# Patient Record
Sex: MALE | Race: BLACK OR AFRICAN AMERICAN | NOT HISPANIC OR LATINO | Employment: OTHER | URBAN - METROPOLITAN AREA
[De-identification: names, ages, dates, MRNs, and addresses within clinical notes are randomized per-mention and may not be internally consistent; named-entity substitution may affect disease eponyms.]

---

## 2021-02-23 ENCOUNTER — HISTORICAL (OUTPATIENT)
Dept: LAB | Facility: HOSPITAL | Age: 61
End: 2021-02-23

## 2021-02-23 LAB
APPEARANCE, UA: CLEAR
BACTERIA SPEC CULT: NORMAL
BILIRUB UR QL STRIP: NEGATIVE
COLOR UR: YELLOW
GLUCOSE (UA): NEGATIVE
HGB UR QL STRIP: NEGATIVE
KETONES UR QL STRIP: NEGATIVE
LEUKOCYTE ESTERASE UR QL STRIP: NEGATIVE
NITRITE UR QL STRIP: NEGATIVE
PH UR STRIP: 7 [PH] (ref 4.6–8)
PROT UR QL STRIP: NEGATIVE
RBC #/AREA URNS HPF: NORMAL /[HPF]
SARS-COV-2 AG RESP QL IA.RAPID: NEGATIVE
SP GR UR STRIP: <=1.005 (ref 1–1.03)
SQUAMOUS EPITHELIAL, UA: NORMAL
UROBILINOGEN UR STRIP-ACNC: 0.2
WBC #/AREA URNS HPF: NORMAL /[HPF]

## 2021-02-26 ENCOUNTER — HISTORICAL (OUTPATIENT)
Dept: MEDSURG UNIT | Facility: HOSPITAL | Age: 61
End: 2021-02-26

## 2021-03-01 LAB — SARS-COV-2 AG RESP QL IA.RAPID: NOT DETECTED

## 2021-03-02 ENCOUNTER — HISTORICAL (OUTPATIENT)
Dept: ANESTHESIOLOGY | Facility: HOSPITAL | Age: 61
End: 2021-03-02

## 2021-03-04 ENCOUNTER — HISTORICAL (OUTPATIENT)
Dept: ADMINISTRATIVE | Facility: HOSPITAL | Age: 61
End: 2021-03-04

## 2021-10-11 ENCOUNTER — HISTORICAL (OUTPATIENT)
Dept: SURGERY | Facility: HOSPITAL | Age: 61
End: 2021-10-11

## 2021-10-11 LAB — SARS-COV-2 AG RESP QL IA.RAPID: NOT DETECTED

## 2021-10-12 ENCOUNTER — HISTORICAL (OUTPATIENT)
Dept: ANESTHESIOLOGY | Facility: HOSPITAL | Age: 61
End: 2021-10-12

## 2022-03-25 ENCOUNTER — HISTORICAL (OUTPATIENT)
Dept: CARDIOLOGY | Facility: HOSPITAL | Age: 62
End: 2022-03-25

## 2022-04-11 ENCOUNTER — HISTORICAL (OUTPATIENT)
Dept: ADMINISTRATIVE | Facility: HOSPITAL | Age: 62
End: 2022-04-11

## 2022-04-21 ENCOUNTER — HISTORICAL (OUTPATIENT)
Dept: ADMINISTRATIVE | Facility: HOSPITAL | Age: 62
End: 2022-04-21
Payer: MEDICAID

## 2022-04-25 VITALS
WEIGHT: 170 LBS | DIASTOLIC BLOOD PRESSURE: 75 MMHG | HEIGHT: 70 IN | OXYGEN SATURATION: 98 % | BODY MASS INDEX: 24.34 KG/M2 | SYSTOLIC BLOOD PRESSURE: 136 MMHG

## 2022-04-30 NOTE — OP NOTE
Patient:   Ja Baker             MRN: [id number]            FIN: [id number]               Age:   60 years     Sex:  Male     :  1960   Associated Diagnoses:   Encounter for screening colonoscopy   Author:   Bita Martinez MD      Operative Note   Operative Information   Date/ Time:  2021 09:15:00.     Procedures Performed: Procedure Code   Colonoscopy, flexible; diagnostic, including collection of specimen(s) by brushing or washing, when performed (separate procedure) (72742)..     Indications: 60-year-old white male in need of first age-appropriate colonoscopy with no family history colorectal cancer.     Preoperative Diagnosis: Encounter for screening colonoscopy (MFT48-BL Z12.11).     Postoperative Diagnosis: Encounter for screening colonoscopy (HPN32-KR Z12.11).     Surgeon: Bita Martinez MD.     Anesthesia: Intravenous MAC.     Speciman Removed: None.     Description of Procedure/Findings/    Complications: Patient was brought to the endoscopy suite laid in the left lateral decubitus position right side up.  Intravenous anesthesia was provided.  Digital rectal exam performed exhibiting good anal rectal tone and no masses.  An endoscope was then passed through the anus intubating the rectum and with gentle insufflation reaching the cecum.  Upon reaching the cecum pictures were taken the scope was then slowly withdrawn.  Overall the cecum ascending transverse descending and rectosigmoid colon all appear to be grossly normal without mass polyp or ulceration seen.  Scope was retroflexed in the distal rectum revealing grade 1-2 internal hemorrhoids without sign of thrombosis ulceration or bleeding.  Scope was returned to neutral position the colon was evacuated of air.  Patient was relieved of anesthesia stable condition and transfer the postanesthesia care unit..     Esimated blood loss: No blood loss.     Findings: Normal colonoscopy.     Complications: None.     Notes: Recommendation  no repeat colonoscopy in 7 years.

## 2022-04-30 NOTE — OP NOTE
Patient:   Ja Baker             MRN: [id number]            FIN: [id number]               Age:   60 years     Sex:  Male     :  1960   Associated Diagnoses:   Bilateral inguinal hernia, without obstruction or gangrene   Author:   Bita Martinez MD      Operative Note   Operative Information   Date/ Time:  3/2/2021 17:11:00.     Procedures Performed: Procedure Code   Hernia Repair Inguinal Laparoscopic Robot Assist (Bilateral) on 3/2/2021 at 60 Years.  Comments:  3/2/2021 16:06 CST - Rich ROSENTHAL, Celestino Dailey  auto-populated from documented surgical case  Laparoscopy, surgical; repair initial inguinal hernia (27470).  Comments:  3/29/2021 7:42 CDT - Bita Martinez MD  Modifier 50-bilateral procedure.     Indications: 60-year-old white male with bulging of the bilateral inguinal canals most consistent with symptomatic bilateral inguinal hernias elected to undergo robotic assisted laparoscopic bilateral inguinal hernia repair with mesh.     Preoperative Diagnosis: Bilateral inguinal hernia, without obstruction or gangrene (PXR39-BC K40.2).     Postoperative Diagnosis: Bilateral inguinal hernia, without obstruction or gangrene (YRD39-MD K40.2).     Surgeon: Bita Martinez MD.     Anesthesia: General.     Speciman Removed: None.     Description of Procedure/Findings/    Complications:  Patient was brought to the operating theater laid in the supine position general endotracheal intubation anesthesia was performed.  Cabrera catheter was then placed.  The abdomen from bilateral nipples down to bilateral groins were sterilely prepped and draped in normal surgical fashion.  1% lidocaine and epinephrine were used to infiltrate the supraumbilical site and a 15 blade was used to excise incise the skin with dissection down to the fascia.  The abdomen was entered by Veress needle technique without difficulty.  The abdomen was then insufflated 15 mmHg.  Two 8 mm trochars were then placed in bilateral  quadrants adjacent to robotic view port approximately 10 cm from the midline under direct visualization.  The patient was then placed in slight Trendelenburg position and easy identification of the right and left inguinal regions were evaluated.  There was noted to be a direct right inguinal hernia without identified mesh and an direct left inguinal hernia.  The laproscopic robot was then brought into position overlying the right side of the patient.  The robotic arms were then securely fashion to the port sites and two robotic instruments were introduced into the abdomen under direct visualization.  All appropriate internal anatomy was identified and the peritoneum was incised using endoscopic may approximately 8 cm above the superior edge of the identified right  and left indirect inguinal hernia.  The dissection was carried from a level of the ASIS to the median umbilical ligament in bilateral lower quadrants.  A large peritoneal flap was then mobilized inferiorly using blunt and sharp dissection.  The inferior epigastric vessels were exposed and the pubic symphysis was identified medially.  Catarino's ligament was then dissected was junction with the iliac vein.  Dissection was continued inferiorly to the iliopubic track care was taken to avoid injury to the femoral branch of the genitofemoral nerve and the lateral femoral cutaneous nerve.  All the cord structures were then peritonealized after reducing the peritoneal sac from within the inguinal canals bilaterally.  It was then gently dissected from the cord structures and reduced back into the peritoneal cavity.  After completion of the lateral dissection taking care to note all sensory nerves two large pieces of composite mesh were then introduced into the abdomen.  They were then worked into position in the preperitoneal space and unrolled to completely cover the indirect, direct and femoral canal spaces.  This mesh was a self adherent mesh made by covidein  not requiring suturing or staples.  The peritoneal flaps were then closed over the mesh making sure not to have rolled edges and reapproximated using monofilament suture in a running locking fashion.  Sutures were then removed from the abdomen under direct visualization and the ports were removed from the abdomen under direct visualization.  At this point the abdomen was allowed to exsufflated and the instruments were removed.  The patient was returned to normal resting supine position.  The port sites were then closed.    The skin was reapproximated using running 4-0 subcuticular Monocryl.  The patient was relieved anesthesia in a stable condition and transferred to postanesthesia care unit.  All lap instrument counts were correct ×3.  .     Esimated blood loss: loss  5  cc.     Complications: None.

## 2022-04-30 NOTE — OP NOTE
Patient:   Ja Baker             MRN: [id number]            FIN: [id number]               Age:   60 years     Sex:  Male     :  1960   Associated Diagnoses:   Mass of soft tissue; Pilar cyst of scalp   Author:   Bita Martinez MD      Operative Note   Operative Information   Date/ Time:  10/12/2021 14:45:00.     Procedures Performed: Procedure Code   Excision, tumor, soft tissue of face or scalp, subcutaneous; 2 cm or greater (71054)..     Indications: 60-year-old male with symptomatic enlarging soft tissue mass on the apex of the scalp elected to undergo excision for treatment of symptoms.     Preoperative Diagnosis: Mass of soft tissue (CQA45-OW M79.89).     Postoperative Diagnosis: Pilar cyst of scalp (VSY53-PA L72.11).     Surgeon: Bita Martinez MD.     Anesthesia: Intravenous MAC.     Speciman Removed: 4 x 3 cm subcutaneous cyst of the scalp.     Description of Procedure/Findings/    Complications: Patient was brought to the operative theater laid in a supine position intravenous MAC anesthesia was provided.  Preoperative antibiotics administered.  The apex of the scalp was then trimmed of all hair and sterilely prepped and draped in normal surgical fashion using chlorhexidine.  1% lidocaine with epinephrine used filtrated the subcutaneous tissue surrounding this region.  15 blade was then used to incise the skin in a longitudinal manner with further dissection carried out using tenotomy scissors.  Subcutaneous mass appeared to be that of a pilar cyst.  It was circumferentially dissected and removed in total.  The cavity was made hemostatic using Bovie cauterization.  The wound edges were then reapproximated in a multilayered fashion using 3-0 Vicryl of the deep dermal and 4-0 nylon on the skin.  A sterile bandage was then placed upon the wound.  Patient was then relieved of anesthesia stable condition and transferred postanesthesia care unit..     Esimated blood loss: loss  3  cc.      Complications: None.

## 2022-05-12 ENCOUNTER — TELEPHONE (OUTPATIENT)
Dept: CARDIAC SURGERY | Facility: CLINIC | Age: 62
End: 2022-05-12
Payer: MEDICAID

## 2022-05-16 ENCOUNTER — HOSPITAL ENCOUNTER (OUTPATIENT)
Dept: RADIOLOGY | Facility: HOSPITAL | Age: 62
Discharge: HOME | DRG: 253 | End: 2022-05-16
Attending: THORACIC SURGERY (CARDIOTHORACIC VASCULAR SURGERY)
Payer: MEDICAID

## 2022-05-16 ENCOUNTER — ANESTHESIA EVENT (OUTPATIENT)
Dept: SURGERY | Facility: HOSPITAL | Age: 62
DRG: 253 | End: 2022-05-16
Payer: MEDICAID

## 2022-05-16 DIAGNOSIS — I73.9 PERIPHERAL VASCULAR DISEASE, UNSPECIFIED: ICD-10-CM

## 2022-05-16 DIAGNOSIS — Z79.01 LONG TERM (CURRENT) USE OF ANTICOAGULANTS: ICD-10-CM

## 2022-05-16 PROCEDURE — 99223 PR INITIAL HOSPITAL CARE,LEVL III: ICD-10-PCS | Mod: 57,,, | Performed by: PHYSICIAN ASSISTANT

## 2022-05-16 PROCEDURE — 71046 X-RAY EXAM CHEST 2 VIEWS: CPT | Mod: TC

## 2022-05-16 PROCEDURE — 99223 1ST HOSP IP/OBS HIGH 75: CPT | Mod: 57,,, | Performed by: PHYSICIAN ASSISTANT

## 2022-05-16 NOTE — H&P
[facility]  History & Physical  Cardiothoracic Surgery    SUBJECTIVE:     Chief Complaint/Reason for Admission: leg pain    History of Present Illness:  Patient is a 61 y.o. male presents with  Right leg claudication  Has occuled l iliac and r fem stenosis and sfa disease      Family History of Heart Disease: no    No current facility-administered medications on file prior to encounter.     No current outpatient medications on file prior to encounter.        Review of patient's allergies indicates:  Not on File     No past medical history on file.     No past surgical history on file.        Review of Systems:  Review of Systems   All other systems reviewed and are negative.     r leg pain    OBJECTIVE:        Physical Exam:  Physical Exam  Vitals reviewed.   HENT:      Head: Normocephalic.      Right Ear: Tympanic membrane normal.      Left Ear: Tympanic membrane normal.      Nose: Nose normal.      Mouth/Throat:      Mouth: Mucous membranes are moist.   Eyes:      Pupils: Pupils are equal, round, and reactive to light.   Cardiovascular:      Rate and Rhythm: Normal rate and regular rhythm.      Pulses: Normal pulses.   Pulmonary:      Effort: Pulmonary effort is normal.      Breath sounds: Normal breath sounds.   Abdominal:      Palpations: Abdomen is soft.   Musculoskeletal:         General: Normal range of motion.      Cervical back: Normal range of motion.   Skin:     General: Skin is warm.   Neurological:      General: No focal deficit present.      Mental Status: He is alert.   Psychiatric:         Mood and Affect: Mood normal.          Laboratory:  I have reviewed all pertinent lab results within the past 24 hours.    Diagnostic Results:  CT: Reviewed          ASSESSMENT/PLAN:     A: PVD  P: Fem- Fem, r fem endarterectomy, R fem pop

## 2022-05-16 NOTE — ANESTHESIA PREPROCEDURE EVALUATION
05/16/2022  Ja Baker is a 61 y.o., male  Right leg claudication  Has occuled l iliac and r fem stenosis and sfa disease. Here for fem-fem and right fempop bypass.  No reported hx CAD, not able to exercise due to claudication. Quit smoking.  Pre-op Assessment    I have reviewed the Patient Summary Reports.     I have reviewed the Nursing Notes. I have reviewed the NPO Status.   I have reviewed the Medications.     Review of Systems  Anesthesia Hx:  No problems with previous Anesthesia    Cardiovascular:  Peripheral Arterial Disease, Claudication Femoral-Popliteal Occlusive Disease, right        Physical Exam  General: Well nourished, Cooperative, Alert and Oriented    Airway:  Mallampati: II / II  Mouth Opening: Normal  TM Distance: Normal  Tongue: Normal  Neck ROM: Normal ROM    Dental:  Periodontal disease  Many broken/rotten teeth  None loose  Heart:  Rate: Normal  Rhythm: Regular Rhythm        Anesthesia Plan  Type of Anesthesia, risks & benefits discussed:    Anesthesia Type: Gen ETT  Intra-op Monitoring Plan: Standard ASA Monitors and Art Line  Post Op Pain Control Plan: multimodal analgesia and IV/PO Opioids PRN  Airway Plan: Direct, Post-Induction  Informed Consent: Informed consent signed with the Patient and all parties understand the risks and agree with anesthesia plan.  All questions answered. Patient consented to blood products? Yes  ASA Score: 3  Day of Surgery Review of History & Physical: H&P Update referred to the surgeon/provider.    Ready For Surgery From Anesthesia Perspective.     .    Latest Reference Range & Units 05/16/22 09:35   WBC 4.5 - 11.5 x10(3)/mcL 9.2   RBC 4.70 - 6.10 x10(6)/mcL 4.88   Hemoglobin 14.0 - 18.0 gm/dL 15.3   Hematocrit 42.0 - 52.0 % 46.2   MCV 80.0 - 94.0 fL 94.7 (H)   MCH 27.0 - 31.0 pg 31.4 (H)   MCHC 33.0 - 36.0 mg/dL 33.1   RDW 11.5 - 17.0 % 14.1    Platelets 130 - 400 x10(3)/mcL 165   (H): Data is abnormally high  Results for orders placed or performed in visit on 05/16/22   EKG 12-lead    Collection Time: 05/16/22  9:43 AM    Narrative    Test Reason : Z79.01,I73.9,    Vent. Rate : 063 BPM     Atrial Rate : 063 BPM     P-R Int : 124 ms          QRS Dur : 082 ms      QT Int : 402 ms       P-R-T Axes : 061 082 071 degrees     QTc Int : 411 ms    Normal sinus rhythm  Normal ECG  No previous ECGs available    Referred By: WILMAN SANDHU           Confirmed By:

## 2022-05-17 ENCOUNTER — ANESTHESIA (OUTPATIENT)
Dept: SURGERY | Facility: HOSPITAL | Age: 62
DRG: 253 | End: 2022-05-17
Payer: MEDICAID

## 2022-05-17 ENCOUNTER — HOSPITAL ENCOUNTER (INPATIENT)
Facility: HOSPITAL | Age: 62
LOS: 1 days | Discharge: HOME | DRG: 253 | End: 2022-05-18
Attending: THORACIC SURGERY (CARDIOTHORACIC VASCULAR SURGERY) | Admitting: THORACIC SURGERY (CARDIOTHORACIC VASCULAR SURGERY)
Payer: MEDICAID

## 2022-05-17 DIAGNOSIS — I73.9 PVD (PERIPHERAL VASCULAR DISEASE) WITH CLAUDICATION: ICD-10-CM

## 2022-05-17 PROCEDURE — C1768 GRAFT, VASCULAR: HCPCS | Performed by: THORACIC SURGERY (CARDIOTHORACIC VASCULAR SURGERY)

## 2022-05-17 PROCEDURE — 63600175 PHARM REV CODE 636 W HCPCS

## 2022-05-17 PROCEDURE — 37000008 HC ANESTHESIA 1ST 15 MINUTES: Performed by: THORACIC SURGERY (CARDIOTHORACIC VASCULAR SURGERY)

## 2022-05-17 PROCEDURE — 36620 INSERTION CATHETER ARTERY: CPT | Performed by: NURSE ANESTHETIST, CERTIFIED REGISTERED

## 2022-05-17 PROCEDURE — 63600175 PHARM REV CODE 636 W HCPCS: Performed by: ANESTHESIOLOGY

## 2022-05-17 PROCEDURE — 35656 BPG FEMORAL-POPLITEAL: CPT | Mod: 51,RT,, | Performed by: THORACIC SURGERY (CARDIOTHORACIC VASCULAR SURGERY)

## 2022-05-17 PROCEDURE — 35656 PR BYPASS GRAFT OTHR,FEM-POP: ICD-10-PCS | Mod: 51,RT,, | Performed by: THORACIC SURGERY (CARDIOTHORACIC VASCULAR SURGERY)

## 2022-05-17 PROCEDURE — 35656 PR BYPASS GRAFT OTHR,FEM-POP: ICD-10-PCS | Mod: 51,AS,RT, | Performed by: PHYSICIAN ASSISTANT

## 2022-05-17 PROCEDURE — 25000003 PHARM REV CODE 250

## 2022-05-17 PROCEDURE — 25000003 PHARM REV CODE 250: Performed by: PHYSICIAN ASSISTANT

## 2022-05-17 PROCEDURE — 35656 BPG FEMORAL-POPLITEAL: CPT | Mod: 51,AS,RT, | Performed by: PHYSICIAN ASSISTANT

## 2022-05-17 PROCEDURE — 36000708 HC OR TIME LEV III 1ST 15 MIN: Performed by: THORACIC SURGERY (CARDIOTHORACIC VASCULAR SURGERY)

## 2022-05-17 PROCEDURE — 37000009 HC ANESTHESIA EA ADD 15 MINS: Performed by: THORACIC SURGERY (CARDIOTHORACIC VASCULAR SURGERY)

## 2022-05-17 PROCEDURE — 35661 BPG FEMORAL-FEMORAL: CPT | Mod: ,,, | Performed by: THORACIC SURGERY (CARDIOTHORACIC VASCULAR SURGERY)

## 2022-05-17 PROCEDURE — L8670 VASCULAR GRAFT, SYNTHETIC: HCPCS | Performed by: THORACIC SURGERY (CARDIOTHORACIC VASCULAR SURGERY)

## 2022-05-17 PROCEDURE — 11000001 HC ACUTE MED/SURG PRIVATE ROOM

## 2022-05-17 PROCEDURE — 27201423 OPTIME MED/SURG SUP & DEVICES STERILE SUPPLY: Performed by: THORACIC SURGERY (CARDIOTHORACIC VASCULAR SURGERY)

## 2022-05-17 PROCEDURE — 35661 PR BYPASS GRAFT OTHR,FEM-FEM: ICD-10-PCS | Mod: AS,,, | Performed by: PHYSICIAN ASSISTANT

## 2022-05-17 PROCEDURE — 71000033 HC RECOVERY, INTIAL HOUR: Performed by: THORACIC SURGERY (CARDIOTHORACIC VASCULAR SURGERY)

## 2022-05-17 PROCEDURE — 36000709 HC OR TIME LEV III EA ADD 15 MIN: Performed by: THORACIC SURGERY (CARDIOTHORACIC VASCULAR SURGERY)

## 2022-05-17 PROCEDURE — 71000015 HC POSTOP RECOV 1ST HR: Performed by: THORACIC SURGERY (CARDIOTHORACIC VASCULAR SURGERY)

## 2022-05-17 PROCEDURE — 35661 BPG FEMORAL-FEMORAL: CPT | Mod: AS,,, | Performed by: PHYSICIAN ASSISTANT

## 2022-05-17 PROCEDURE — 71000039 HC RECOVERY, EACH ADD'L HOUR: Performed by: THORACIC SURGERY (CARDIOTHORACIC VASCULAR SURGERY)

## 2022-05-17 PROCEDURE — 35661 PR BYPASS GRAFT OTHR,FEM-FEM: ICD-10-PCS | Mod: ,,, | Performed by: THORACIC SURGERY (CARDIOTHORACIC VASCULAR SURGERY)

## 2022-05-17 PROCEDURE — 71000016 HC POSTOP RECOV ADDL HR: Performed by: THORACIC SURGERY (CARDIOTHORACIC VASCULAR SURGERY)

## 2022-05-17 PROCEDURE — C9248 INJ, CLEVIDIPINE BUTYRATE: HCPCS | Mod: JG

## 2022-05-17 DEVICE — GRAFT 6MM X 5CM PROPATEN: Type: IMPLANTABLE DEVICE | Site: FEMUR | Status: FUNCTIONAL

## 2022-05-17 DEVICE — GRAFT VAS PROPATEN 6X80 TWR: Type: IMPLANTABLE DEVICE | Site: FEMUR | Status: FUNCTIONAL

## 2022-05-17 RX ORDER — PROPOFOL 10 MG/ML
VIAL (ML) INTRAVENOUS
Status: DISCONTINUED | OUTPATIENT
Start: 2022-05-17 | End: 2022-05-17

## 2022-05-17 RX ORDER — ONDANSETRON 2 MG/ML
4 INJECTION INTRAMUSCULAR; INTRAVENOUS ONCE
Status: DISCONTINUED | OUTPATIENT
Start: 2022-05-17 | End: 2022-05-18 | Stop reason: HOSPADM

## 2022-05-17 RX ORDER — MEPERIDINE HYDROCHLORIDE 25 MG/ML
12.5 INJECTION INTRAMUSCULAR; INTRAVENOUS; SUBCUTANEOUS ONCE
Status: ACTIVE | OUTPATIENT
Start: 2022-05-17 | End: 2022-05-18

## 2022-05-17 RX ORDER — EPHEDRINE SULFATE 50 MG/ML
INJECTION, SOLUTION INTRAVENOUS
Status: DISCONTINUED | OUTPATIENT
Start: 2022-05-17 | End: 2022-05-17

## 2022-05-17 RX ORDER — DULOXETIN HYDROCHLORIDE 30 MG/1
30 CAPSULE, DELAYED RELEASE ORAL DAILY
COMMUNITY
Start: 2022-05-10

## 2022-05-17 RX ORDER — PROTAMINE SULFATE 10 MG/ML
INJECTION, SOLUTION INTRAVENOUS
Status: DISCONTINUED | OUTPATIENT
Start: 2022-05-17 | End: 2022-05-17

## 2022-05-17 RX ORDER — ATORVASTATIN CALCIUM 40 MG/1
40 TABLET, FILM COATED ORAL DAILY
COMMUNITY
Start: 2022-03-22

## 2022-05-17 RX ORDER — HYDROMORPHONE HYDROCHLORIDE 2 MG/ML
INJECTION, SOLUTION INTRAMUSCULAR; INTRAVENOUS; SUBCUTANEOUS
Status: DISCONTINUED | OUTPATIENT
Start: 2022-05-17 | End: 2022-05-17

## 2022-05-17 RX ORDER — MELOXICAM 7.5 MG/1
7.5 TABLET ORAL DAILY
COMMUNITY
Start: 2022-04-14

## 2022-05-17 RX ORDER — CEFAZOLIN SODIUM 1 G/3ML
INJECTION, POWDER, FOR SOLUTION INTRAMUSCULAR; INTRAVENOUS
Status: DISCONTINUED | OUTPATIENT
Start: 2022-05-17 | End: 2022-05-17

## 2022-05-17 RX ORDER — ONDANSETRON 2 MG/ML
INJECTION INTRAMUSCULAR; INTRAVENOUS
Status: DISCONTINUED | OUTPATIENT
Start: 2022-05-17 | End: 2022-05-17

## 2022-05-17 RX ORDER — MIDAZOLAM HYDROCHLORIDE 1 MG/ML
INJECTION INTRAMUSCULAR; INTRAVENOUS
Status: DISCONTINUED | OUTPATIENT
Start: 2022-05-17 | End: 2022-05-17

## 2022-05-17 RX ORDER — DIPHENHYDRAMINE HYDROCHLORIDE 50 MG/ML
25 INJECTION INTRAMUSCULAR; INTRAVENOUS EVERY 6 HOURS PRN
Status: DISCONTINUED | OUTPATIENT
Start: 2022-05-17 | End: 2022-05-18 | Stop reason: HOSPADM

## 2022-05-17 RX ORDER — LIDOCAINE HYDROCHLORIDE 20 MG/ML
INJECTION INTRAVENOUS
Status: DISCONTINUED | OUTPATIENT
Start: 2022-05-17 | End: 2022-05-17

## 2022-05-17 RX ORDER — CEFAZOLIN SODIUM 2 G/50ML
2 SOLUTION INTRAVENOUS
Status: DISCONTINUED | OUTPATIENT
Start: 2022-05-17 | End: 2022-05-18 | Stop reason: HOSPADM

## 2022-05-17 RX ORDER — ASPIRIN 81 MG/1
81 TABLET ORAL DAILY
COMMUNITY
Start: 2022-03-22

## 2022-05-17 RX ORDER — PHENYLEPHRINE HCL IN 0.9% NACL 1 MG/10 ML
SYRINGE (ML) INTRAVENOUS
Status: DISCONTINUED | OUTPATIENT
Start: 2022-05-17 | End: 2022-05-17

## 2022-05-17 RX ORDER — DEXAMETHASONE SODIUM PHOSPHATE 4 MG/ML
INJECTION, SOLUTION INTRA-ARTICULAR; INTRALESIONAL; INTRAMUSCULAR; INTRAVENOUS; SOFT TISSUE
Status: DISCONTINUED | OUTPATIENT
Start: 2022-05-17 | End: 2022-05-17

## 2022-05-17 RX ORDER — CEFAZOLIN SODIUM 1 G/3ML
INJECTION, POWDER, FOR SOLUTION INTRAMUSCULAR; INTRAVENOUS
Status: DISPENSED
Start: 2022-05-17 | End: 2022-05-17

## 2022-05-17 RX ORDER — HYDROCODONE BITARTRATE AND ACETAMINOPHEN 7.5; 325 MG/1; MG/1
1 TABLET ORAL EVERY 4 HOURS PRN
Status: DISCONTINUED | OUTPATIENT
Start: 2022-05-17 | End: 2022-05-18 | Stop reason: HOSPADM

## 2022-05-17 RX ORDER — GLYCOPYRROLATE 0.2 MG/ML
INJECTION INTRAMUSCULAR; INTRAVENOUS
Status: DISCONTINUED | OUTPATIENT
Start: 2022-05-17 | End: 2022-05-17

## 2022-05-17 RX ORDER — LIDOCAINE HYDROCHLORIDE 10 MG/ML
1 INJECTION, SOLUTION EPIDURAL; INFILTRATION; INTRACAUDAL; PERINEURAL ONCE
Status: CANCELLED | OUTPATIENT
Start: 2022-05-17 | End: 2022-05-17

## 2022-05-17 RX ORDER — HYDRALAZINE HYDROCHLORIDE 20 MG/ML
10 INJECTION INTRAMUSCULAR; INTRAVENOUS ONCE
Status: COMPLETED | OUTPATIENT
Start: 2022-05-17 | End: 2022-05-17

## 2022-05-17 RX ORDER — FENTANYL CITRATE 50 UG/ML
INJECTION, SOLUTION INTRAMUSCULAR; INTRAVENOUS
Status: DISCONTINUED | OUTPATIENT
Start: 2022-05-17 | End: 2022-05-17

## 2022-05-17 RX ORDER — HYDRALAZINE HYDROCHLORIDE 20 MG/ML
INJECTION INTRAMUSCULAR; INTRAVENOUS
Status: COMPLETED
Start: 2022-05-17 | End: 2022-05-17

## 2022-05-17 RX ORDER — HYDROMORPHONE HYDROCHLORIDE 2 MG/ML
0.2 INJECTION, SOLUTION INTRAMUSCULAR; INTRAVENOUS; SUBCUTANEOUS EVERY 5 MIN PRN
Status: DISCONTINUED | OUTPATIENT
Start: 2022-05-17 | End: 2022-05-17

## 2022-05-17 RX ORDER — CILOSTAZOL 50 MG/1
50 TABLET ORAL 2 TIMES DAILY
COMMUNITY
Start: 2022-03-22

## 2022-05-17 RX ORDER — SODIUM CHLORIDE 0.9 % (FLUSH) 0.9 %
10 SYRINGE (ML) INJECTION
Status: DISCONTINUED | OUTPATIENT
Start: 2022-05-17 | End: 2022-05-18 | Stop reason: HOSPADM

## 2022-05-17 RX ORDER — HYDROCODONE BITARTRATE AND ACETAMINOPHEN 7.5; 325 MG/1; MG/1
2 TABLET ORAL EVERY 6 HOURS PRN
Status: DISCONTINUED | OUTPATIENT
Start: 2022-05-17 | End: 2022-05-18 | Stop reason: HOSPADM

## 2022-05-17 RX ORDER — ROCURONIUM BROMIDE 10 MG/ML
INJECTION, SOLUTION INTRAVENOUS
Status: DISCONTINUED | OUTPATIENT
Start: 2022-05-17 | End: 2022-05-17

## 2022-05-17 RX ORDER — HYDROMORPHONE HYDROCHLORIDE 2 MG/ML
0.4 INJECTION, SOLUTION INTRAMUSCULAR; INTRAVENOUS; SUBCUTANEOUS EVERY 5 MIN PRN
Status: DISCONTINUED | OUTPATIENT
Start: 2022-05-17 | End: 2022-05-17

## 2022-05-17 RX ORDER — HEPARIN SODIUM 1000 [USP'U]/ML
INJECTION, SOLUTION INTRAVENOUS; SUBCUTANEOUS
Status: DISCONTINUED | OUTPATIENT
Start: 2022-05-17 | End: 2022-05-17

## 2022-05-17 RX ADMIN — HYDRALAZINE HYDROCHLORIDE 10 MG: 20 INJECTION INTRAMUSCULAR; INTRAVENOUS at 10:05

## 2022-05-17 RX ADMIN — HYDROMORPHONE HYDROCHLORIDE 0.4 MG: 2 INJECTION, SOLUTION INTRAMUSCULAR; INTRAVENOUS; SUBCUTANEOUS at 10:05

## 2022-05-17 RX ADMIN — CEFAZOLIN 2 G: 330 INJECTION, POWDER, FOR SOLUTION INTRAMUSCULAR; INTRAVENOUS at 07:05

## 2022-05-17 RX ADMIN — Medication 100 MCG: at 07:05

## 2022-05-17 RX ADMIN — GLYCOPYRROLATE 0.2 MG: 0.2 INJECTION INTRAMUSCULAR; INTRAVENOUS at 07:05

## 2022-05-17 RX ADMIN — PROTAMINE SULFATE 100 MG: 10 INJECTION, SOLUTION INTRAVENOUS at 09:05

## 2022-05-17 RX ADMIN — HYDROCODONE BITARTRATE AND ACETAMINOPHEN 2 TABLET: 7.5; 325 TABLET ORAL at 04:05

## 2022-05-17 RX ADMIN — PROPOFOL 20 MG: 10 INJECTION, EMULSION INTRAVENOUS at 07:05

## 2022-05-17 RX ADMIN — PROPOFOL 180 MG: 10 INJECTION, EMULSION INTRAVENOUS at 07:05

## 2022-05-17 RX ADMIN — CLEVIPIDINE 0.5 MG/HR: 0.5 EMULSION INTRAVENOUS at 09:05

## 2022-05-17 RX ADMIN — EPHEDRINE SULFATE 5 MG: 50 INJECTION INTRAVENOUS at 07:05

## 2022-05-17 RX ADMIN — FENTANYL CITRATE 100 MCG: 50 INJECTION, SOLUTION INTRAMUSCULAR; INTRAVENOUS at 07:05

## 2022-05-17 RX ADMIN — CLEVIPIDINE 1 MG/HR: 0.5 EMULSION INTRAVENOUS at 08:05

## 2022-05-17 RX ADMIN — SUGAMMADEX 200 MG: 100 INJECTION, SOLUTION INTRAVENOUS at 09:05

## 2022-05-17 RX ADMIN — HYDROMORPHONE HYDROCHLORIDE 0.4 MG: 2 INJECTION INTRAMUSCULAR; INTRAVENOUS; SUBCUTANEOUS at 09:05

## 2022-05-17 RX ADMIN — MIDAZOLAM 2 MG: 1 INJECTION INTRAMUSCULAR; INTRAVENOUS at 06:05

## 2022-05-17 RX ADMIN — LIDOCAINE HYDROCHLORIDE 40 MG: 20 INJECTION INTRAVENOUS at 07:05

## 2022-05-17 RX ADMIN — ONDANSETRON 4 MG: 2 INJECTION INTRAMUSCULAR; INTRAVENOUS at 09:05

## 2022-05-17 RX ADMIN — ROCURONIUM BROMIDE 20 MG: 10 SOLUTION INTRAVENOUS at 07:05

## 2022-05-17 RX ADMIN — HEPARIN SODIUM 10000 UNITS: 1000 INJECTION, SOLUTION INTRAVENOUS; SUBCUTANEOUS at 08:05

## 2022-05-17 RX ADMIN — DEXAMETHASONE SODIUM PHOSPHATE 8 MG: 4 INJECTION, SOLUTION INTRA-ARTICULAR; INTRALESIONAL; INTRAMUSCULAR; INTRAVENOUS; SOFT TISSUE at 07:05

## 2022-05-17 RX ADMIN — SODIUM CHLORIDE, SODIUM GLUCONATE, SODIUM ACETATE, POTASSIUM CHLORIDE AND MAGNESIUM CHLORIDE: 526; 502; 368; 37; 30 INJECTION, SOLUTION INTRAVENOUS at 07:05

## 2022-05-17 RX ADMIN — ROCURONIUM BROMIDE 50 MG: 10 SOLUTION INTRAVENOUS at 07:05

## 2022-05-17 NOTE — OP NOTE
[facility]  Card[facility]  Operative Note    SUMMARY     Date of Procedure: 5/17/2022     Procedure:  1. Femoral to femoral bypass with 6 mm ringed Wren-Ponce  2. Right femoral popliteal bypass with 6 mm ringed Wren-Ponce    Surgeon: KADEN Michelle     Assistant: Lee Castellon    Pre-Operative Diagnosis:  Severe peripheral arterial disease with left common iliac artery occlusion and right superficial femoral artery occlusion  Post-Operative Diagnosis:  Same    Anesthesia: General    Operative Findings (including complications, if any):  Same patient was lived in the operating room and bilateral groin incisions were made revealing severe peripheral    Description of Technical Procedures:  Arterial disease.  Patient was loaded the operating room and prepped and draped in usual sterile fashion.  Bilateral groin incisions were made and the common femoral arteries were isolated bilaterally a posterior tibial incision was made at the right knee subcutaneous tissue were dissected with Bovie electrocautery and popliteal artery was similarly isolated.  6 mm ringed Wren-Ponce grafts were then passed through subsartorial tunnel in the right thigh and pre fascial tunnel across the anterior surface of the pubis.  Heparin was administered.  The femoral femoral anastomosis was performed with 5 0 Wren-Ponce suture bilaterally and the femoral popliteal bypass similarly performed with 500 Wren-Ponce at the popliteal site as well as the proximal site on the common femoral artery.  The grafts were de-aired in the usual fashion.  Clamps were removed and protamine was administered.  Hemostasis was achieved.  The wounds were irrigated with copious amounts of saline and closed in layers with Vicryl.  The patient tolerated the procedure well there was minimal blood loss.  He will be delivered to the recovery room in stable condition    Estimated Blood Loss (EBL): Fifty mL          Specimens:    Specimen (24h ago, onward)            None                  Condition: Good    Disposition: PACU - hemodynamically stable.

## 2022-05-17 NOTE — ANESTHESIA POSTPROCEDURE EVALUATION
Anesthesia Post Evaluation    Patient: Ja Baker    Procedure(s) Performed: Procedure(s) (LRB):  CREATION, BYPASS, ARTERIAL, FEMORAL TO POPLITEAL, USING GRAFT (Right)    Final Anesthesia Type: general      Patient location during evaluation: PACU  Patient participation: Yes- Able to Participate  Level of consciousness: awake and alert  Post-procedure vital signs: reviewed and stable  Pain management: adequate  Airway patency: patent      Anesthetic complications: no      Cardiovascular status: blood pressure returned to baseline  Respiratory status: unassisted  Hydration status: euvolemic  Follow-up not needed.          Vitals Value Taken Time   /69 05/17/22 1021   Temp 36.6 °C (97.9 °F) 05/17/22 1000   Pulse 76 05/17/22 1026   Resp 7 05/17/22 1026   SpO2 99 % 05/17/22 1026   Vitals shown include unvalidated device data.      No case tracking events are documented in the log.      Pain/Chase Score: Pain Rating Prior to Med Admin: 6 (5/17/2022 10:20 AM)  Chase Score: 8 (5/17/2022 10:00 AM)

## 2022-05-17 NOTE — ANESTHESIA PROCEDURE NOTES
Intubation    Date/Time: 5/17/2022 7:14 AM  Performed by: Moira Bernal  Authorized by: Moira Bernal     Intubation:     Induction:  Intravenous    Intubated:  Postinduction    Mask Ventilation:  Easy mask    Attempts:  1    Attempted By:  Student    Method of Intubation:  Direct    Blade:  Doshi 2    Laryngeal View Grade: Grade I - full view of cords      Difficult Airway Encountered?: No      Complications:  None    Airway Device:  Oral endotracheal tube    Airway Device Size:  7.5    Style/Cuff Inflation:  Cuffed (inflated to minimal occlusive pressure)    Inflation Amount (mL):  7    Tube secured:  22    Secured at:  The lips    Placement Verified By:  Capnometry    Complicating Factors:  None    Findings Post-Intubation:  BS equal bilateral and atraumatic/condition of teeth unchanged  Notes:      Cuff pressure 25 cmh20

## 2022-05-17 NOTE — TRANSFER OF CARE
"Anesthesia Transfer of Care Note    Patient: Ja Baker    Procedure(s) Performed: Procedure(s) (LRB):  CREATION, BYPASS, ARTERIAL, FEMORAL TO POPLITEAL, USING GRAFT (Right)    Patient location: PACU    Anesthesia Type: general    Transport from OR: Transported from OR on room air with adequate spontaneous ventilation    Post pain: adequate analgesia    Post assessment: no apparent anesthetic complications and tolerated procedure well    Post vital signs: stable    Level of consciousness: sedated    Nausea/Vomiting: no nausea/vomiting    Transfer of care protocol was followed      Last vitals:   Visit Vitals  BP (!) 140/80   Pulse 63   Temp 36.8 °C (98.2 °F) (Oral)   Resp 16   Ht 5' 10" (1.778 m)   Wt 72.5 kg (159 lb 13.3 oz)   SpO2 97%   BMI 22.93 kg/m²     "

## 2022-05-17 NOTE — ANESTHESIA PROCEDURE NOTES
Arterial    Diagnosis: Blood Pressure Monitoring Needed    Patient location during procedure: holding area  Procedure start time: 5/17/2022 6:50 AM  Timeout: 5/17/2022 6:49 AM  Procedure end time: 5/17/2022 6:52 AM    Staffing  Authorizing Provider: Virginia G Dabadie, CRNA  Performing Provider: Virginia G Dabadie, CRNA      Preanesthetic Checklist  Completed: patient identified, IV checked, site marked, risks and benefits discussed, surgical consent, monitors and equipment checked, pre-op evaluation, timeout performed and anesthesia consent givenArterial  Skin Prep: chlorhexidine gluconate  Local Infiltration: lidocaine  Orientation: right  Location: radial    Catheter Size: 20 G  Catheter placement by Anatomical landmarks. Heme positive aspiration all ports. Insertion Attempts: 1  Assessment  Dressing: secured with tape and tegaderm and secured with tape  Patient: Tolerated well

## 2022-05-17 NOTE — ANESTHESIA PROCEDURE NOTES
Peripheral IV Insertion    Diagnosis: I99.8 Other disorder of circulatory system and I87.9 Disorder of vein, unspecified.    Patient location during procedure: OR  Procedure start time: 5/17/2022 7:26 AM  Timeout: 5/17/2022 7:25 AM  Procedure end time: 5/17/2022 7:28 AM    Staffing  Authorizing Provider: Moira Bernal  Performing Provider: Moira Bernal      Preanesthetic Checklist  Completed: patient identified, IV checked, site marked, risks and benefits discussed, surgical consent, monitors and equipment checked, pre-op evaluation, timeout performed and anesthesia consent givenPeripheral IV Insertion  Skin Prep: alcohol swabs  Local Infiltration: none  Orientation: right  Location: forearm  Catheter Type: peripheral IV (single lumen)  Catheter Size: 18 G  Catheter placement by Anatomical landmarks. Heme positive aspiration all ports. Insertion Attempts: 1  Assessment  Dressing: secured with tape and tegaderm  Patient: Tolerated well  Line flushed easily.

## 2022-05-17 NOTE — PROGRESS NOTES
SUBJECTIVE:      Chief Complaint/Reason for Admission: leg pain     History of Present Illness:  Patient is a 61 y.o. male presents with  Right leg claudication  Has occuled l iliac and r fem stenosis and sfa disease        Family History of Heart Disease: no     No current facility-administered medications on file prior to encounter.      No current outpatient medications on file prior to encounter.         Review of patient's allergies indicates:  Not on File      No past medical history on file.      No past surgical history on file.         Review of Systems:  Review of Systems   All other systems reviewed and are negative.     r leg pain     OBJECTIVE:         Physical Exam:  Physical Exam  Vitals reviewed.   HENT:      Head: Normocephalic.      Right Ear: Tympanic membrane normal.      Left Ear: Tympanic membrane normal.      Nose: Nose normal.      Mouth/Throat:      Mouth: Mucous me  SUBJECTIVE:      Chief Complaint/Reason for Admission: leg pain     History of Present Illness:  Patient is a 61 y.o. male presents with  Right leg claudication  Has occuled l iliac and r fem stenosis and sfa disease        Family History of Heart Disease: no     No current facility-administered medications on file prior to encounter.      No current outpatient medications on file prior to encounter.         Review of patient's allergies indicates:  Not on File      No past medical history on file.      No past surgical history on file.         Review of Systems:  Review of Systems   All other systems reviewed and are negative.     r leg pain     OBJECTIVE:         Physical Exam:  Physical Exam  Vitals reviewed.   HENT:      Head: Normocephalic.      Right Ear: Tympanic membrane normal.      Left Ear: Tympanic membrane normal.      Nose: Nose normal.      Mouth/Throat:      Mouth: Mucous membranes are moist.   Eyes:      Pupils: Pupils are equal, round, and reactive to light.   Cardiovascular:      Rate and Rhythm: Normal rate  and regular rhythm.      Pulses: Normal pulses.   Pulmonary:      Effort: Pulmonary effort is normal.      Breath sounds: Normal breath sounds.   Abdominal:      Palpations: Abdomen is soft.   Musculoskeletal:         General: Normal range of motion.      Cervical back: Normal range of motion.   Skin:     General: Skin is warm.   Neurological:      General: No focal deficit present.      Mental Status: He is alert.   Psychiatric:         Mood and Affect: Mood normal.            Laboratory:  I have reviewed all pertinent lab results within the past 24 hours.     Diagnostic Results:  CT: Reviewed              ASSESSMENT/PLAN:      A: PVD  P: Fem- Fem, r fem endarterectomy, R fem pop today Dr Michelle

## 2022-05-18 VITALS
HEART RATE: 63 BPM | RESPIRATION RATE: 18 BRPM | DIASTOLIC BLOOD PRESSURE: 81 MMHG | WEIGHT: 159.81 LBS | TEMPERATURE: 98 F | OXYGEN SATURATION: 99 % | HEIGHT: 70 IN | BODY MASS INDEX: 22.88 KG/M2 | SYSTOLIC BLOOD PRESSURE: 178 MMHG

## 2022-05-18 PROBLEM — I73.9 PVD (PERIPHERAL VASCULAR DISEASE): Status: ACTIVE | Noted: 2022-05-18

## 2022-05-18 PROCEDURE — 99024 POSTOP FOLLOW-UP VISIT: CPT | Mod: ,,, | Performed by: PHYSICIAN ASSISTANT

## 2022-05-18 PROCEDURE — 99024 PR POST-OP FOLLOW-UP VISIT: ICD-10-PCS | Mod: ,,, | Performed by: PHYSICIAN ASSISTANT

## 2022-05-18 RX ORDER — CLOPIDOGREL BISULFATE 75 MG/1
75 TABLET ORAL DAILY
Qty: 30 TABLET | Refills: 11 | Status: SHIPPED | OUTPATIENT
Start: 2022-05-18 | End: 2023-10-13

## 2022-05-18 RX ORDER — HYDROCODONE BITARTRATE AND ACETAMINOPHEN 7.5; 325 MG/1; MG/1
1 TABLET ORAL EVERY 4 HOURS PRN
Qty: 30 TABLET | Refills: 0 | Status: SHIPPED | OUTPATIENT
Start: 2022-05-18

## 2022-05-18 NOTE — PROGRESS NOTES
Cardiothoracic Surgery Progress Note    Subjective:  KALLIE. Pain well controlled. Tolerating his diet.     Objective:  Temp:  [97.7 °F (36.5 °C)-98.6 °F (37 °C)]   Pulse:  [52-96]   Resp:  [11-18]   BP: (131-181)/(63-84)   SpO2:  [95 %-100 %]   Arterial Line BP: (176-188)/(53-57)     NAD on RA  Equal chest rise b/l  RRR  Abd s/nt/nd  RLE 2+ DP, LLE warm well perfused  Incisions c/d    Intake/Output Summary (Last 24 hours) at 5/18/2022 0713  Last data filed at 5/18/2022 0400  Gross per 24 hour   Intake 2740.54 ml   Output 2300 ml   Net 440.54 ml       Recent Labs     05/16/22  0935   WBC 9.2   HGB 15.3   HCT 46.2      *   K 5.0   CO2 27   BUN 16.4   CREATININE 1.01   BILITOT 0.4   AST 18   ALT 18   ALKPHOS 73   CALCIUM 9.5   ALBUMIN 4.3       Assessment:  POD1 fem-fem bypass and right fem-pop bypass    Plan:  Ambulate this morning  Pain control  Cardiac diet  Likely home soon    PEDRO Oglesby MD PGY4

## 2022-05-18 NOTE — DISCHARGE SUMMARY
[facility] - 6th Floor Medical Telemetry  Discharge Note  Short Stay    Procedure(s) (LRB):  CREATION, BYPASS, ARTERIAL, FEMORAL TO POPLITEAL, USING GRAFT (Right)  ENDARTERECTOMY, FEMORAL (Right)    OUTCOME: Patient tolerated treatment/procedure well without complication and is now ready for discharge.    DISPOSITION: Home or Self Care    FINAL DIAGNOSIS:  PVD (peripheral vascular disease)    FOLLOWUP: In clinic    DISCHARGE INSTRUCTIONS:  No discharge procedures on file.     TIME SPENT ON DISCHARGE: 15 minutes

## 2022-05-19 ENCOUNTER — TELEPHONE (OUTPATIENT)
Dept: CARDIAC SURGERY | Facility: CLINIC | Age: 62
End: 2022-05-19
Payer: MEDICAID

## 2022-05-19 NOTE — TELEPHONE ENCOUNTER
Returned call, spoke with pt. States he never had a catheter before and just reporting some bruising to penis and scrotal areas.  Pt s/p Fem/Fem Bypass-PVD.  Pt denies any pain or difficulty urinating.  Inst pt that some pain, bruising, irritation are common.  Continue to monitor and if anything new arises or current condition worsens to call back to Dr. Michelle's office.  Pt verbalized understanding.     ----- Message from Claudio Rodriguez sent at 5/19/2022 11:07 AM CDT -----  Regarding: post op questions Dr michelle  pt  Contact: pt called  Please call pt back [phone],questions about where they put the catheter

## 2022-05-20 ENCOUNTER — PATIENT OUTREACH (OUTPATIENT)
Dept: ADMINISTRATIVE | Facility: CLINIC | Age: 62
End: 2022-05-20
Payer: MEDICAID

## 2022-05-20 NOTE — PROGRESS NOTES
C3 nurse attempted to contact Ja Baker for a TCC post hospital discharge follow up call. No answer. Left voicemail with callback information. The patient has a scheduled HOSFU appointment with Dr. Ranjeet Cox on 06/06/2022 @ 0920 am.

## 2022-05-23 NOTE — PROGRESS NOTES
C3 nurse attempted to contact Ja Baker for a TCC post hospital discharge follow up call. No answer. Left voicemail with call back number.

## 2022-06-06 ENCOUNTER — OFFICE VISIT (OUTPATIENT)
Dept: CARDIAC SURGERY | Facility: CLINIC | Age: 62
End: 2022-06-06
Payer: MEDICAID

## 2022-06-06 VITALS
HEIGHT: 70 IN | WEIGHT: 162 LBS | HEART RATE: 69 BPM | BODY MASS INDEX: 23.19 KG/M2 | SYSTOLIC BLOOD PRESSURE: 179 MMHG | DIASTOLIC BLOOD PRESSURE: 91 MMHG

## 2022-06-06 DIAGNOSIS — I73.9 PAD (PERIPHERAL ARTERY DISEASE): ICD-10-CM

## 2022-06-06 PROCEDURE — 3008F BODY MASS INDEX DOCD: CPT | Mod: CPTII,,, | Performed by: THORACIC SURGERY (CARDIOTHORACIC VASCULAR SURGERY)

## 2022-06-06 PROCEDURE — 1160F RVW MEDS BY RX/DR IN RCRD: CPT | Mod: CPTII,,, | Performed by: THORACIC SURGERY (CARDIOTHORACIC VASCULAR SURGERY)

## 2022-06-06 PROCEDURE — 99024 PR POST-OP FOLLOW-UP VISIT: ICD-10-PCS | Mod: ,,, | Performed by: THORACIC SURGERY (CARDIOTHORACIC VASCULAR SURGERY)

## 2022-06-06 PROCEDURE — 3080F PR MOST RECENT DIASTOLIC BLOOD PRESSURE >= 90 MM HG: ICD-10-PCS | Mod: CPTII,,, | Performed by: THORACIC SURGERY (CARDIOTHORACIC VASCULAR SURGERY)

## 2022-06-06 PROCEDURE — 3080F DIAST BP >= 90 MM HG: CPT | Mod: CPTII,,, | Performed by: THORACIC SURGERY (CARDIOTHORACIC VASCULAR SURGERY)

## 2022-06-06 PROCEDURE — 99024 POSTOP FOLLOW-UP VISIT: CPT | Mod: ,,, | Performed by: THORACIC SURGERY (CARDIOTHORACIC VASCULAR SURGERY)

## 2022-06-06 PROCEDURE — 3008F PR BODY MASS INDEX (BMI) DOCUMENTED: ICD-10-PCS | Mod: CPTII,,, | Performed by: THORACIC SURGERY (CARDIOTHORACIC VASCULAR SURGERY)

## 2022-06-06 PROCEDURE — 1159F MED LIST DOCD IN RCRD: CPT | Mod: CPTII,,, | Performed by: THORACIC SURGERY (CARDIOTHORACIC VASCULAR SURGERY)

## 2022-06-06 PROCEDURE — 3077F PR MOST RECENT SYSTOLIC BLOOD PRESSURE >= 140 MM HG: ICD-10-PCS | Mod: CPTII,,, | Performed by: THORACIC SURGERY (CARDIOTHORACIC VASCULAR SURGERY)

## 2022-06-06 PROCEDURE — 1159F PR MEDICATION LIST DOCUMENTED IN MEDICAL RECORD: ICD-10-PCS | Mod: CPTII,,, | Performed by: THORACIC SURGERY (CARDIOTHORACIC VASCULAR SURGERY)

## 2022-06-06 PROCEDURE — 3077F SYST BP >= 140 MM HG: CPT | Mod: CPTII,,, | Performed by: THORACIC SURGERY (CARDIOTHORACIC VASCULAR SURGERY)

## 2022-06-06 PROCEDURE — 1111F DSCHRG MED/CURRENT MED MERGE: CPT | Mod: CPTII,,, | Performed by: THORACIC SURGERY (CARDIOTHORACIC VASCULAR SURGERY)

## 2022-06-06 PROCEDURE — 1111F PR DISCHARGE MEDS RECONCILED W/ CURRENT OUTPATIENT MED LIST: ICD-10-PCS | Mod: CPTII,,, | Performed by: THORACIC SURGERY (CARDIOTHORACIC VASCULAR SURGERY)

## 2022-06-06 PROCEDURE — 1160F PR REVIEW ALL MEDS BY PRESCRIBER/CLIN PHARMACIST DOCUMENTED: ICD-10-PCS | Mod: CPTII,,, | Performed by: THORACIC SURGERY (CARDIOTHORACIC VASCULAR SURGERY)

## 2022-06-06 NOTE — PROGRESS NOTES
Subjective:       Patient ID: Ja Baker is a 61 y.o. male.    Chief Complaint: Post-op Evaluation (S/p 5/17/22 Rt Fem-Fem bypass)      HPI:    Patient is now status post fem fem/fem pop right bypass and is doing well without complaints.  He reports no evidence of claudication at this time.        Review of Systems   Constitutional: Negative.    HENT: Negative.    Respiratory: Negative.    Cardiovascular: Negative.    Gastrointestinal: Negative.    Musculoskeletal: Negative.    Integumentary:  Negative.   Hematological: Negative.    Psychiatric/Behavioral: Negative.    All other systems reviewed and are negative.            Objective:      Physical Exam  Vitals and nursing note reviewed.   Constitutional:       General: He is not in acute distress.     Appearance: Normal appearance.   HENT:      Head: Normocephalic and atraumatic.      Nose: Nose normal.      Mouth/Throat:      Mouth: Mucous membranes are moist.   Eyes:      Extraocular Movements: Extraocular movements intact.      Conjunctiva/sclera: Conjunctivae normal.      Pupils: Pupils are equal, round, and reactive to light.   Neck:      Thyroid: No thyroid mass or thyromegaly.      Vascular: No carotid bruit or JVD.   Cardiovascular:      Rate and Rhythm: Normal rate and regular rhythm.      Pulses:           Carotid pulses are 2+ on the right side and 2+ on the left side.       Radial pulses are 2+ on the right side and 2+ on the left side.        Femoral pulses are 2+ on the right side and 2+ on the left side.       Dorsalis pedis pulses are detected w/ Doppler on the right side and detected w/ Doppler on the left side.        Posterior tibial pulses are detected w/ Doppler on the right side and detected w/ Doppler on the left side.      Heart sounds: No murmur heard.    No friction rub. No gallop.   Pulmonary:      Effort: Pulmonary effort is normal. No respiratory distress.      Breath sounds: Normal breath sounds. No stridor. No wheezing, rhonchi or  rales.   Chest:      Chest wall: No tenderness.   Breasts:      Right: No axillary adenopathy or supraclavicular adenopathy.      Left: No axillary adenopathy or supraclavicular adenopathy.       Abdominal:      General: Abdomen is flat. Bowel sounds are normal. There is no distension.      Palpations: Abdomen is soft. There is no hepatomegaly, splenomegaly, mass or pulsatile mass.      Tenderness: There is no abdominal tenderness. There is no rebound.   Musculoskeletal:         General: No swelling. Normal range of motion.      Cervical back: Normal range of motion. No rigidity or tenderness.      Right lower le+ Edema present.      Left lower leg: No edema.   Lymphadenopathy:      Cervical: No cervical adenopathy.      Upper Body:      Right upper body: No supraclavicular or axillary adenopathy.      Left upper body: No supraclavicular or axillary adenopathy.   Skin:     General: Skin is warm and dry.      Comments: Wounds CDI   Neurological:      General: No focal deficit present.      Mental Status: He is alert and oriented to person, place, and time. Mental status is at baseline.      Cranial Nerves: No cranial nerve deficit.      Sensory: No sensory deficit.      Motor: No weakness.      Gait: Gait normal.   Psychiatric:         Mood and Affect: Mood normal.           Assessment & Plan:       Problem List Items Addressed This Visit        Cardiac/Vascular    PAD (peripheral artery disease)     Patient is doing well status post fem fem/fem pop bypass on the right.  He reports increased ability to exercise without evidence of claudication.  Continue dual anti-platelet therapy.  Surveillance BLE a with ANDREW on 6 month visit.

## 2022-06-06 NOTE — ASSESSMENT & PLAN NOTE
Patient is doing well status post fem fem/fem pop bypass on the right.  He reports increased ability to exercise without evidence of claudication.  Continue dual anti-platelet therapy.  Surveillance BLE a with ANDREW on 6 month visit.

## 2022-11-29 DIAGNOSIS — I73.9 PAD (PERIPHERAL ARTERY DISEASE): Primary | ICD-10-CM

## 2022-12-12 ENCOUNTER — OFFICE VISIT (OUTPATIENT)
Dept: CARDIAC SURGERY | Facility: CLINIC | Age: 62
End: 2022-12-12
Payer: MEDICAID

## 2022-12-12 VITALS — HEART RATE: 60 BPM | SYSTOLIC BLOOD PRESSURE: 132 MMHG | DIASTOLIC BLOOD PRESSURE: 78 MMHG

## 2022-12-12 DIAGNOSIS — I73.9 PAD (PERIPHERAL ARTERY DISEASE): Primary | ICD-10-CM

## 2022-12-12 PROCEDURE — 1160F RVW MEDS BY RX/DR IN RCRD: CPT | Mod: CPTII,,, | Performed by: THORACIC SURGERY (CARDIOTHORACIC VASCULAR SURGERY)

## 2022-12-12 PROCEDURE — 3078F DIAST BP <80 MM HG: CPT | Mod: CPTII,,, | Performed by: THORACIC SURGERY (CARDIOTHORACIC VASCULAR SURGERY)

## 2022-12-12 PROCEDURE — 99024 PR POST-OP FOLLOW-UP VISIT: ICD-10-PCS | Mod: ,,, | Performed by: THORACIC SURGERY (CARDIOTHORACIC VASCULAR SURGERY)

## 2022-12-12 PROCEDURE — 1159F PR MEDICATION LIST DOCUMENTED IN MEDICAL RECORD: ICD-10-PCS | Mod: CPTII,,, | Performed by: THORACIC SURGERY (CARDIOTHORACIC VASCULAR SURGERY)

## 2022-12-12 PROCEDURE — 3075F SYST BP GE 130 - 139MM HG: CPT | Mod: CPTII,,, | Performed by: THORACIC SURGERY (CARDIOTHORACIC VASCULAR SURGERY)

## 2022-12-12 PROCEDURE — 3078F PR MOST RECENT DIASTOLIC BLOOD PRESSURE < 80 MM HG: ICD-10-PCS | Mod: CPTII,,, | Performed by: THORACIC SURGERY (CARDIOTHORACIC VASCULAR SURGERY)

## 2022-12-12 PROCEDURE — 1159F MED LIST DOCD IN RCRD: CPT | Mod: CPTII,,, | Performed by: THORACIC SURGERY (CARDIOTHORACIC VASCULAR SURGERY)

## 2022-12-12 PROCEDURE — 99024 POSTOP FOLLOW-UP VISIT: CPT | Mod: ,,, | Performed by: THORACIC SURGERY (CARDIOTHORACIC VASCULAR SURGERY)

## 2022-12-12 PROCEDURE — 1160F PR REVIEW ALL MEDS BY PRESCRIBER/CLIN PHARMACIST DOCUMENTED: ICD-10-PCS | Mod: CPTII,,, | Performed by: THORACIC SURGERY (CARDIOTHORACIC VASCULAR SURGERY)

## 2022-12-12 PROCEDURE — 3075F PR MOST RECENT SYSTOLIC BLOOD PRESS GE 130-139MM HG: ICD-10-PCS | Mod: CPTII,,, | Performed by: THORACIC SURGERY (CARDIOTHORACIC VASCULAR SURGERY)

## 2022-12-12 NOTE — ASSESSMENT & PLAN NOTE
Normal progress.  Fem/fem and fem/pop on the right are widely patent by ultrasound which I have reviewed and interpreted.  Continue dual anti-platelet therapy as directed.  Return to clinic in 6 months with BLEA and ANDREW

## 2022-12-12 NOTE — PROGRESS NOTES
Patient is status post fem/fem and right fem/pop bypass grafting doing well without complaints   Vital signs stable/afebrile   Regular rate and rhythm without murmurs rubs or gallops  Coarse breath sounds bilaterally   Bilateral radial artery and common femoral artery pulses 2+.  Right popliteal and pedal pulses 2+.  Left infrainguinal pulses diminished.  BLEA reviewed   A/P:  Doing well without complaints.  Normal progress.  Fem/fem and fem/pop on the right are widely patent by ultrasound which I have reviewed and interpreted.  Continue dual anti-platelet therapy as directed.  Return to clinic in 6 months with BLEA and ABIPlan per Cardiology

## 2023-03-28 ENCOUNTER — PATIENT MESSAGE (OUTPATIENT)
Dept: RESEARCH | Facility: HOSPITAL | Age: 63
End: 2023-03-28
Payer: MEDICAID

## 2023-04-04 ENCOUNTER — PATIENT MESSAGE (OUTPATIENT)
Dept: RESEARCH | Facility: HOSPITAL | Age: 63
End: 2023-04-04
Payer: MEDICAID

## 2023-10-12 ENCOUNTER — PATIENT MESSAGE (OUTPATIENT)
Dept: ADMINISTRATIVE | Facility: OTHER | Age: 63
End: 2023-10-12
Payer: MEDICAID

## 2023-10-13 ENCOUNTER — HOSPITAL ENCOUNTER (OUTPATIENT)
Facility: HOSPITAL | Age: 63
Discharge: HOME | End: 2023-10-13
Attending: INTERNAL MEDICINE | Admitting: INTERNAL MEDICINE
Payer: MEDICAID

## 2023-10-13 VITALS
BODY MASS INDEX: 31.73 KG/M2 | SYSTOLIC BLOOD PRESSURE: 137 MMHG | HEART RATE: 63 BPM | DIASTOLIC BLOOD PRESSURE: 64 MMHG | RESPIRATION RATE: 7 BRPM | OXYGEN SATURATION: 100 % | TEMPERATURE: 98 F | HEIGHT: 67 IN | WEIGHT: 202.19 LBS

## 2023-10-13 DIAGNOSIS — I70.203 BILATERAL FEMORAL ARTERY STENOSIS: ICD-10-CM

## 2023-10-13 PROCEDURE — C1887 CATHETER, GUIDING: HCPCS | Performed by: INTERNAL MEDICINE

## 2023-10-13 PROCEDURE — 25500020 PHARM REV CODE 255: Performed by: INTERNAL MEDICINE

## 2023-10-13 PROCEDURE — 63600175 PHARM REV CODE 636 W HCPCS: Performed by: INTERNAL MEDICINE

## 2023-10-13 PROCEDURE — 99152 MOD SED SAME PHYS/QHP 5/>YRS: CPT | Performed by: INTERNAL MEDICINE

## 2023-10-13 PROCEDURE — 27201423 OPTIME MED/SURG SUP & DEVICES STERILE SUPPLY: Performed by: INTERNAL MEDICINE

## 2023-10-13 PROCEDURE — 25000003 PHARM REV CODE 250: Performed by: INTERNAL MEDICINE

## 2023-10-13 PROCEDURE — C1894 INTRO/SHEATH, NON-LASER: HCPCS | Performed by: INTERNAL MEDICINE

## 2023-10-13 PROCEDURE — 36246 INS CATH ABD/L-EXT ART 2ND: CPT | Mod: RT | Performed by: INTERNAL MEDICINE

## 2023-10-13 PROCEDURE — 75716 ARTERY X-RAYS ARMS/LEGS: CPT | Performed by: INTERNAL MEDICINE

## 2023-10-13 PROCEDURE — 99153 MOD SED SAME PHYS/QHP EA: CPT | Performed by: INTERNAL MEDICINE

## 2023-10-13 PROCEDURE — C1769 GUIDE WIRE: HCPCS | Performed by: INTERNAL MEDICINE

## 2023-10-13 RX ORDER — HEPARIN SODIUM 1000 [USP'U]/ML
INJECTION, SOLUTION INTRAVENOUS; SUBCUTANEOUS
Status: DISCONTINUED | OUTPATIENT
Start: 2023-10-13 | End: 2023-10-13 | Stop reason: HOSPADM

## 2023-10-13 RX ORDER — VERAPAMIL HYDROCHLORIDE 2.5 MG/ML
INJECTION, SOLUTION INTRAVENOUS
Status: DISCONTINUED | OUTPATIENT
Start: 2023-10-13 | End: 2023-10-13 | Stop reason: HOSPADM

## 2023-10-13 RX ORDER — ACETAMINOPHEN 325 MG/1
650 TABLET ORAL EVERY 4 HOURS PRN
Status: DISCONTINUED | OUTPATIENT
Start: 2023-10-13 | End: 2023-10-13 | Stop reason: HOSPADM

## 2023-10-13 RX ORDER — SODIUM CHLORIDE 9 MG/ML
INJECTION, SOLUTION INTRAVENOUS ONCE
Status: COMPLETED | OUTPATIENT
Start: 2023-10-13 | End: 2023-10-13

## 2023-10-13 RX ORDER — FENTANYL CITRATE 50 UG/ML
INJECTION, SOLUTION INTRAMUSCULAR; INTRAVENOUS
Status: DISCONTINUED | OUTPATIENT
Start: 2023-10-13 | End: 2023-10-13 | Stop reason: HOSPADM

## 2023-10-13 RX ORDER — HYDRALAZINE HYDROCHLORIDE 20 MG/ML
10 INJECTION INTRAMUSCULAR; INTRAVENOUS EVERY 4 HOURS PRN
Status: DISCONTINUED | OUTPATIENT
Start: 2023-10-13 | End: 2023-10-13 | Stop reason: HOSPADM

## 2023-10-13 RX ORDER — DIPHENHYDRAMINE HCL 25 MG
50 CAPSULE ORAL
Status: DISPENSED | OUTPATIENT
Start: 2023-10-13

## 2023-10-13 RX ORDER — MIDAZOLAM HYDROCHLORIDE 1 MG/ML
INJECTION INTRAMUSCULAR; INTRAVENOUS
Status: DISCONTINUED | OUTPATIENT
Start: 2023-10-13 | End: 2023-10-13 | Stop reason: HOSPADM

## 2023-10-13 RX ORDER — SODIUM CHLORIDE 9 MG/ML
INJECTION, SOLUTION INTRAVENOUS CONTINUOUS
Status: DISCONTINUED | OUTPATIENT
Start: 2023-10-13 | End: 2023-10-13 | Stop reason: HOSPADM

## 2023-10-13 RX ORDER — NITROGLYCERIN 20 MG/100ML
INJECTION INTRAVENOUS
Status: DISCONTINUED | OUTPATIENT
Start: 2023-10-13 | End: 2023-10-13 | Stop reason: HOSPADM

## 2023-10-13 RX ORDER — DIAZEPAM 5 MG/1
10 TABLET ORAL
Status: DISPENSED | OUTPATIENT
Start: 2023-10-13

## 2023-10-13 RX ORDER — RIVAROXABAN 2.5 MG/1
2.5 TABLET, FILM COATED ORAL 2 TIMES DAILY
COMMUNITY

## 2023-10-13 RX ORDER — HYDROCODONE BITARTRATE AND ACETAMINOPHEN 5; 325 MG/1; MG/1
1 TABLET ORAL EVERY 4 HOURS PRN
Status: DISCONTINUED | OUTPATIENT
Start: 2023-10-13 | End: 2023-10-13 | Stop reason: HOSPADM

## 2023-10-13 RX ORDER — LIDOCAINE HYDROCHLORIDE 10 MG/ML
INJECTION INFILTRATION; PERINEURAL
Status: DISCONTINUED | OUTPATIENT
Start: 2023-10-13 | End: 2023-10-13 | Stop reason: HOSPADM

## 2023-10-13 RX ORDER — ISOSORBIDE MONONITRATE 30 MG/1
30 TABLET, EXTENDED RELEASE ORAL DAILY
COMMUNITY

## 2023-10-13 RX ORDER — ONDANSETRON 4 MG/1
8 TABLET, ORALLY DISINTEGRATING ORAL EVERY 8 HOURS PRN
Status: DISCONTINUED | OUTPATIENT
Start: 2023-10-13 | End: 2023-10-13 | Stop reason: HOSPADM

## 2023-10-13 RX ORDER — MORPHINE SULFATE 4 MG/ML
2 INJECTION, SOLUTION INTRAMUSCULAR; INTRAVENOUS EVERY 4 HOURS PRN
Status: DISCONTINUED | OUTPATIENT
Start: 2023-10-13 | End: 2023-10-13 | Stop reason: HOSPADM

## 2023-10-13 RX ORDER — METOPROLOL SUCCINATE 25 MG/1
25 TABLET, EXTENDED RELEASE ORAL DAILY
COMMUNITY

## 2023-10-13 RX ADMIN — SODIUM CHLORIDE: 9 INJECTION, SOLUTION INTRAVENOUS at 10:10

## 2023-10-13 RX ADMIN — DIAZEPAM 10 MG: 5 TABLET ORAL at 10:10

## 2023-10-13 RX ADMIN — DIPHENHYDRAMINE HYDROCHLORIDE 50 MG: 25 CAPSULE ORAL at 10:10

## 2023-10-13 NOTE — INTERVAL H&P NOTE
Patient name: Ja Baker  MRN: [id number]  : 1960  Cath Lab Procedure H&P Update    Pre-Procedure Assessment:    I saw and examined the patient face to face. The patient has been re-evaluated and his condition is unchanged. The reason for admission, procedure and care is still present.  Based on the patients H&P, pre-procedure physical exam, relevant diagnostic studies, NPO status and information obtained from the patient, I determine the patient is an appropriate candidate for the proposed procedure and anesthesia planned. I further certify the anesthesia risks, benefits and options have been explained to the patient to which he agrees as documented on the procedural consent.

## 2023-10-13 NOTE — DISCHARGE INSTRUCTIONS
Remove dressing and armboard in 24hrs.  - Can shower in 24hrs, use soap and water only.   -No driving for two Days  -Do not lift anything heavier than a gallon of milk for 5 days.  -Do not submerge site under water for 5 days.   -No lotions, powders, creams around site for 5 days.  - Return to the nearest emergency room if you start running a fever; have any kind of discharge coming from the site, the site looks red or swollen.  - If site starts to bleed, lay flat on the ground, apply pressure to the site and call 911.     Resume Xarelto and Pletal tonight.

## 2023-10-13 NOTE — Clinical Note
The catheter was inserted into the  middle infrarenal abdominal aorta. Catheter was then advanced to the level of RCFA.

## 2023-10-13 NOTE — Clinical Note
The groin, right radial and left foot was prepped. The site was prepped with ChloraPrep. The site was clipped. The patient was draped.

## 2023-10-16 NOTE — DISCHARGE SUMMARY
[facility] - Cath Lab Services  Discharge Note  Short Stay    Procedure(s) (LRB):  Angiogram, Abdominal Aorta W/ Extremity Runoff (N/A)      OUTCOME: Patient tolerated treatment/procedure well without complication and is now ready for discharge.    DISPOSITION: Home or Self Care    FINAL DIAGNOSIS:  PAD    FOLLOWUP: In clinic    DISCHARGE INSTRUCTIONS:  See dc orders.       Clinical Reference Documents Added to Patient Instructions         Document    ANGIOGRAPHY (ENGLISH)            TIME SPENT ON DISCHARGE: 31 minutes

## (undated) DEVICE — GUIDEWIRE INQWIRE SE 3MM JTIP

## (undated) DEVICE — BAG MEDI-PLAST DECANTER C-FLOW

## (undated) DEVICE — SYS WASTE FLD DISPOSAL 1400ML

## (undated) DEVICE — Device

## (undated) DEVICE — TRAY CATH FOL SIL URIMTR 16FR

## (undated) DEVICE — INSERT SOFTJAW FOGARTY CLMP 33

## (undated) DEVICE — SUT PROLENE 5-0 36IN C-1

## (undated) DEVICE — PENCIL E-Z CLEAN ROCKER 15FT

## (undated) DEVICE — TOWEL OR BLUE STRL 16X26 4/PK

## (undated) DEVICE — SUT VICRYL 2-0 27 CT-1

## (undated) DEVICE — ELECTRODE BLADE INSULATED 1 IN

## (undated) DEVICE — SUT 3/0 27IN COATED VICRYL

## (undated) DEVICE — DRESSING TELFA + BARR 4X6IN

## (undated) DEVICE — KIT SURGICAL TURNOVER

## (undated) DEVICE — KIT GLIDESHEATH SLEND 6FR 10CM

## (undated) DEVICE — DRAPE ANGIO BRACH 38X44IN

## (undated) DEVICE — CATH GLIDECATH PGTL 4FR 150CM

## (undated) DEVICE — LOOP STERION MINI RED 8X406MM

## (undated) DEVICE — SYR 30CC LUER LOCK

## (undated) DEVICE — COVER PROBE US 5.5X58L NON LTX

## (undated) DEVICE — SUT VICRYL 2-0 36 CT-1

## (undated) DEVICE — ADHESIVE DERMABOND ADVANCED

## (undated) DEVICE — SUT 3-0 12-18IN SILK

## (undated) DEVICE — SUT PROLENE 6-0 24 BV-1

## (undated) DEVICE — GAUZE VISTEC XR DTECT 16 4X4IN

## (undated) DEVICE — TUBE SUCTION MEDI-VAC STERILE

## (undated) DEVICE — SYS VIRTUOSAPH PLUS EVM

## (undated) DEVICE — CHLORAPREP W TINT 26ML APPL

## (undated) DEVICE — GLOVE PROTEXIS LTX 7.5

## (undated) DEVICE — BANDAGE COMPR 6IN HOOK 6INX5YD

## (undated) DEVICE — HEMOSTAT SURGICEL NU-KNIT 6X9

## (undated) DEVICE — BOWL STERILE LARGE 32OZ

## (undated) DEVICE — DRAPE SLUSH WARMER 66X44IN

## (undated) DEVICE — GLOVE SURG BIOGEL LATEX SZ 7.5

## (undated) DEVICE — SEE MEDLINE ITEM 157125

## (undated) DEVICE — BAND TR COMP DEVICE REG 24CM

## (undated) DEVICE — SHEET XLGE DRAPE

## (undated) DEVICE — SOL NORMAL USPCA 0.9%

## (undated) DEVICE — LOOP STERION MAXI YEL 1X406MM

## (undated) DEVICE — TUBING SUC UNIV W/CONN 12FT

## (undated) DEVICE — SUT 3-0 MONOCRYL PLUS PS-2

## (undated) DEVICE — DRAPE STERI INCISE 23X23IN

## (undated) DEVICE — SUT PROLENE 6-0 C-1 30IN BL

## (undated) DEVICE — SET ANGIO ACIST CVI ANGIOTOUCH

## (undated) DEVICE — CLIP JAW SPRING DBL SFT 6MM

## (undated) DEVICE — BLADE 4IN EDGE INSULATED

## (undated) DEVICE — DRAPE INCISE IOBAN 2 23X23IN

## (undated) DEVICE — SUT 2-0 VICRYL / SH (J417)

## (undated) DEVICE — SUT VICRYL 3-0 27 SH

## (undated) DEVICE — BANDAGE COMPR VELCLOSE 4INX5YD

## (undated) DEVICE — SUT 4-0 12-18IN SILK BLACK

## (undated) DEVICE — CANNULA NASAL ADULT

## (undated) DEVICE — SUT 2-0 12-18IN SILK

## (undated) DEVICE — KIT SURGIFLO HEMOSTATIC MATRIX